# Patient Record
Sex: FEMALE | URBAN - METROPOLITAN AREA
[De-identification: names, ages, dates, MRNs, and addresses within clinical notes are randomized per-mention and may not be internally consistent; named-entity substitution may affect disease eponyms.]

---

## 2024-04-15 ENCOUNTER — TRANSCRIBE ORDERS (OUTPATIENT)
Dept: ADMINISTRATIVE | Age: 54
End: 2024-04-15

## 2024-04-15 DIAGNOSIS — R92.30 DENSE BREASTS: Primary | ICD-10-CM

## 2025-02-28 ENCOUNTER — TRANSCRIBE ORDERS (OUTPATIENT)
Dept: WOMENS IMAGING | Age: 55
End: 2025-02-28

## 2025-02-28 DIAGNOSIS — R92.2 INCONCLUSIVE MAMMOGRAPHY: Primary | ICD-10-CM

## 2025-03-14 ENCOUNTER — TRANSCRIBE ORDERS (OUTPATIENT)
Dept: ADMINISTRATIVE | Age: 55
End: 2025-03-14

## 2025-03-14 DIAGNOSIS — Z12.31 ENCOUNTER FOR SCREENING MAMMOGRAM FOR MALIGNANT NEOPLASM OF BREAST: Primary | ICD-10-CM

## 2025-03-28 ENCOUNTER — HOSPITAL ENCOUNTER (OUTPATIENT)
Dept: WOMENS IMAGING | Age: 55
Discharge: HOME | End: 2025-03-28
Attending: INTERNAL MEDICINE
Payer: COMMERCIAL

## 2025-03-28 VITALS — WEIGHT: 155 LBS | HEIGHT: 67 IN | BODY MASS INDEX: 24.33 KG/M2

## 2025-03-28 DIAGNOSIS — R92.2 INCONCLUSIVE MAMMOGRAPHY: ICD-10-CM

## 2025-03-28 PROCEDURE — 76641 ULTRASOUND BREAST COMPLETE: CPT

## 2025-04-08 ENCOUNTER — HOSPITAL ENCOUNTER (OUTPATIENT)
Dept: WOMENS IMAGING | Age: 55
Discharge: HOME | End: 2025-04-08
Attending: RADIOLOGY

## 2025-04-08 DIAGNOSIS — Z00.6 ENCOUNTER FOR EXAMINATION FOR NORMAL COMPARISON OR CONTROL IN CLINICAL RESEARCH PROGRAM: ICD-10-CM

## 2025-04-18 ASSESSMENT — RHEUMATOLOGY NEW PATIENT QUESTIONNAIRE
INCREASED SUSCEPTIBILITY TO INFECTION: N
MEMORY LOSS: Y
EASY BRUISING: Y
EXCESSIVE HAIR LOSS (MORE THAN YOUR NORM): N
EASILY LOSING TEMPER: Y
LOSS OF VISION: N
UNUSUAL FATIGUE: N
DRYNESS OF MOUTH: N
BLOOD IN STOOLS: N
BEHAVIORAL CHANGES: N
ABNORMAL URINE: N
RASH: N
HEADACHES: N
EYE PAIN: N
COLOR CHANGES OF HANDS OR FEET IN THE COLD: Y
DIFFICULTY FALLING ASLEEP: Y
NIGHT SWEATS: N
MORNING STIFFNESS: Y
EYE DRYNESS: Y
RASH OR ULCERS: N
SEIZURES: N
SUN SENSITIVE (SUN ALLERGY): N
UNEXPLAINED HEARING LOSS: N
NUMBNESS OR TINGLING IN HANDS OR FEET: Y
FAINTING: N
DEPRESSION: N
BLACK STOOLS: N
ANEMIA: N
UNEXPLAINED WEIGHT CHANGE: N
UNUSUALLY RAPID OR SLOWED HEART RATE: N
VAGINAL DRYNESS: N
DIFFICULTY STAYING ASLEEP: Y
NAUSEA: N
JAUNDICE: N
HEARTBURN OR REFLUX: N
DIFFICULTY BREATHING LYING DOWN: N
NODULES/BUMPS: Y
PERSISTENT DIARRHEA: N
JOINT SWELLING: N
HOW WOULD YOU DESCRIBE YOUR STIFFNESS ON AVERAGE: MODERATE
SKIN REDNESS: N
DIFFICULTY SWALLOWING: N
HOARSE VOICE: N
SORES IN MOUTH OR NOSE: N
COUGH: N
SWOLLEN LEGS OR FEET: Y
VOMITING OF BLOOD OR COFFEE GROUND CONSISTENCY MATERIAL: N
JOINT PAIN: Y
PAIN OR BURNING ON URINATION: N
SHORTNESS OF BREATH: N
DOUBLE OR BLURRED VISION: Y
MUSCLE WEAKNESS: N
FEVER: N
CHEST PAIN: N
STOMACH PAIN: N
SKIN TIGHTNESS: N
SWOLLEN OR TENDER GLANDS: N
MORNING STIFFNESS IN LOWER BACK: Y
EYE REDNESS: N
UNUSUAL BLEEDING: N
LOSS OF CONSCIOUSNESS: N
AGITATION: Y
ANXIETY: Y

## 2025-04-21 ENCOUNTER — OFFICE VISIT (OUTPATIENT)
Age: 55
End: 2025-04-21
Payer: COMMERCIAL

## 2025-04-21 VITALS
HEART RATE: 71 BPM | SYSTOLIC BLOOD PRESSURE: 116 MMHG | HEIGHT: 67 IN | OXYGEN SATURATION: 100 % | DIASTOLIC BLOOD PRESSURE: 78 MMHG | WEIGHT: 161.2 LBS | BODY MASS INDEX: 25.3 KG/M2

## 2025-04-21 DIAGNOSIS — R76.8 POSITIVE ANA (ANTINUCLEAR ANTIBODY): Primary | ICD-10-CM

## 2025-04-21 PROCEDURE — 99203 OFFICE O/P NEW LOW 30 MIN: CPT | Performed by: INTERNAL MEDICINE

## 2025-04-21 RX ORDER — BIMATOPROST 0.3 MG/ML
SOLUTION/ DROPS OPHTHALMIC
COMMUNITY
Start: 2025-03-05

## 2025-04-21 RX ORDER — ATORVASTATIN CALCIUM 20 MG/1
TABLET, FILM COATED ORAL
COMMUNITY

## 2025-04-21 RX ORDER — ESTRADIOL 0.04 MG/D
PATCH, EXTENDED RELEASE TRANSDERMAL
COMMUNITY
Start: 2025-03-11

## 2025-04-21 RX ORDER — MAXZIDE 75; 50 MG/1; MG/1
TABLET ORAL
COMMUNITY

## 2025-04-21 RX ORDER — PROGESTERONE 100 MG/1
CAPSULE ORAL
COMMUNITY
Start: 2025-03-11

## 2025-04-21 RX ORDER — VALACYCLOVIR HYDROCHLORIDE 500 MG/1
TABLET, FILM COATED ORAL
COMMUNITY

## 2025-04-21 RX ORDER — OMEPRAZOLE 40 MG/1
CAPSULE, DELAYED RELEASE ORAL
COMMUNITY

## 2025-04-21 RX ORDER — GABAPENTIN 600 MG/1
TABLET ORAL
COMMUNITY
Start: 2025-03-27

## 2025-04-21 RX ORDER — SPIRONOLACTONE 100 MG/1
TABLET, FILM COATED ORAL
COMMUNITY

## 2025-04-21 ASSESSMENT — ENCOUNTER SYMPTOMS
ABDOMINAL PAIN: 0
COUGH: 0
VOMITING: 0
SHORTNESS OF BREATH: 0
NAUSEA: 0

## 2025-04-21 NOTE — PROGRESS NOTES
[facility]   Rheumatology Clinic Note      4/21/2025       Reason for Consult:  positive EUGENE  Requesting Physician:  Corona Gonzalez, *     CHIEF COMPLAINT:    Chief Complaint   Patient presents with    New Patient     + EUGENE    Other     No prior rheumatology history.    Pain level 0           HISTORY OF PRESENT ILLNESS:    54 y.o. female presents today for evaluation of positive EUGENE. This test was part of work up of elevated LFTs.    Has chronic low back pain that is being attributed to bulging disc in lumbar spine. However, reports that she lives an active life, works out three days a week (resistance and cardio).  No significant pain in other joints. No h/o painful swollen joints.  No prolonged morning stiffness.    No skin rash or photosensitivity.  No oral ulcers.  No SOB.      Past Medical History:     has no past medical history on file.    Past Surgical History:     has a past surgical history that includes Breast enhancement surgery.    Current Medications:    No current outpatient medications on file.    Allergies:    Patient has no allergy information on record.    Social History:         Family History:   family history is not on file.      REVIEW OF SYSTEMS:    Review of Systems   Constitutional:  Negative for chills and fever.   HENT:  Negative for mouth sores.    Respiratory:  Negative for cough and shortness of breath.    Cardiovascular:  Negative for chest pain.   Gastrointestinal:  Negative for abdominal pain, nausea and vomiting.   Skin:  Negative for rash.          PHYSICAL EXAM:    Vitals:    Ht 1.702 m (5' 7.01\")   Wt 73.1 kg (161 lb 3.2 oz)   BMI 25.24 kg/m²     Physical Exam  Constitutional:       General: She is not in acute distress.     Appearance: Normal appearance.   Eyes:      Conjunctiva/sclera: Conjunctivae normal.   Pulmonary:      Effort: Pulmonary effort is normal.   Musculoskeletal:         General: No swelling or deformity.   Skin:     Findings: No rash.